# Patient Record
Sex: FEMALE | URBAN - METROPOLITAN AREA
[De-identification: names, ages, dates, MRNs, and addresses within clinical notes are randomized per-mention and may not be internally consistent; named-entity substitution may affect disease eponyms.]

---

## 2024-05-07 ENCOUNTER — APPOINTMENT (RX ONLY)
Dept: URBAN - METROPOLITAN AREA CLINIC 77 | Facility: CLINIC | Age: 34
Setting detail: DERMATOLOGY
End: 2024-05-07

## 2024-05-07 DIAGNOSIS — L82.1 OTHER SEBORRHEIC KERATOSIS: ICD-10-CM

## 2024-05-07 DIAGNOSIS — L85.3 XEROSIS CUTIS: ICD-10-CM

## 2024-05-07 PROBLEM — D23.72 OTHER BENIGN NEOPLASM OF SKIN OF LEFT LOWER LIMB, INCLUDING HIP: Status: ACTIVE | Noted: 2024-05-07

## 2024-05-07 PROCEDURE — 99203 OFFICE O/P NEW LOW 30 MIN: CPT

## 2024-05-07 PROCEDURE — ? COUNSELING

## 2024-05-07 PROCEDURE — ? LOCATION MARKER (SIMPLE)

## 2024-06-12 ENCOUNTER — APPOINTMENT (RX ONLY)
Dept: URBAN - METROPOLITAN AREA CLINIC 77 | Facility: CLINIC | Age: 34
Setting detail: DERMATOLOGY
End: 2024-06-12

## 2024-06-12 DIAGNOSIS — L60.3 NAIL DYSTROPHY: ICD-10-CM

## 2024-06-12 DIAGNOSIS — D18.0 HEMANGIOMA: ICD-10-CM

## 2024-06-12 DIAGNOSIS — D22 MELANOCYTIC NEVI: ICD-10-CM

## 2024-06-12 DIAGNOSIS — L82.0 INFLAMED SEBORRHEIC KERATOSIS: ICD-10-CM

## 2024-06-12 DIAGNOSIS — L81.4 OTHER MELANIN HYPERPIGMENTATION: ICD-10-CM

## 2024-06-12 PROBLEM — D18.01 HEMANGIOMA OF SKIN AND SUBCUTANEOUS TISSUE: Status: ACTIVE | Noted: 2024-06-12

## 2024-06-12 PROBLEM — D22.5 MELANOCYTIC NEVI OF TRUNK: Status: ACTIVE | Noted: 2024-06-12

## 2024-06-12 PROBLEM — D23.9 OTHER BENIGN NEOPLASM OF SKIN, UNSPECIFIED: Status: ACTIVE | Noted: 2024-06-12

## 2024-06-12 PROCEDURE — 17110 DESTRUCTION B9 LES UP TO 14: CPT

## 2024-06-12 PROCEDURE — ? ADDITIONAL NOTES

## 2024-06-12 PROCEDURE — ? LIQUID NITROGEN

## 2024-06-12 PROCEDURE — ? TREATMENT REGIMEN

## 2024-06-12 PROCEDURE — 99213 OFFICE O/P EST LOW 20 MIN: CPT | Mod: 25

## 2024-06-12 PROCEDURE — ? COUNSELING

## 2024-06-12 ASSESSMENT — LOCATION DETAILED DESCRIPTION DERM
LOCATION DETAILED: UPPER STERNUM
LOCATION DETAILED: RIGHT MEDIAL SUPERIOR CHEST
LOCATION DETAILED: RIGHT ANTERIOR SHOULDER
LOCATION DETAILED: LEFT ANTERIOR SHOULDER
LOCATION DETAILED: LEFT SUPERIOR MEDIAL MIDBACK
LOCATION DETAILED: LEFT INFERIOR MEDIAL FOREHEAD
LOCATION DETAILED: PERIUMBILICAL SKIN

## 2024-06-12 ASSESSMENT — LOCATION SIMPLE DESCRIPTION DERM
LOCATION SIMPLE: CHEST
LOCATION SIMPLE: LEFT FOREHEAD
LOCATION SIMPLE: LEFT LOWER BACK
LOCATION SIMPLE: RIGHT SHOULDER
LOCATION SIMPLE: LEFT SHOULDER
LOCATION SIMPLE: ABDOMEN

## 2024-06-12 ASSESSMENT — LOCATION ZONE DERM
LOCATION ZONE: FACE
LOCATION ZONE: ARM
LOCATION ZONE: TRUNK

## 2024-06-12 NOTE — PROCEDURE: LIQUID NITROGEN
Show Aperture Variable?: Yes
Render Note In Bullet Format When Appropriate: No
Medical Necessity Clause: This procedure was medically necessary because the lesions that were treated were: warts.
Duration Of Freeze Thaw-Cycle (Seconds): 2
Detail Level: Detailed
Consent: The patient's consent was obtained including but not limited to risks of crusting, scabbing, blistering, scarring, darker or lighter pigmentary change, recurrence, incomplete removal and infection.
Medical Necessity Information: It is in your best interest to select a reason for this procedure from the list below. All of these items fulfill various CMS LCD requirements except the new and changing color options.
Spray Paint Text: The liquid nitrogen was applied to the skin utilizing a spray paint frosting technique.
Post-Care Instructions: I reviewed with the patient in detail post-care instructions. Patient is to wear sunprotection, and avoid picking at any of the treated lesions. Pt may apply Vaseline to crusted or scabbing areas.
Number Of Freeze-Thaw Cycles: 3 freeze-thaw cycles

## 2024-06-12 NOTE — PROCEDURE: ADDITIONAL NOTES
Render Risk Assessment In Note?: no
Additional Notes: Possible Onychomycosis- will biopsy at a later time. Patient possibly pregnant.
Detail Level: Detailed

## 2025-01-15 ENCOUNTER — HOSPITAL ENCOUNTER (OUTPATIENT)
Dept: HOSPITAL 104 - SLDO | Age: 35
Discharge: HOME | End: 2025-01-15
Payer: COMMERCIAL

## 2025-01-15 DIAGNOSIS — R30.0: Primary | ICD-10-CM

## 2025-01-15 LAB
BLOOD,URINE: (no result)
PH,URINE: 6.5 (ref 5–7)
RBC,URINE: < 1 /HPF (ref 0–3)
SPECIFIC GRAVITY,URINE: 1.01 (ref 1–1.03)
SQUAMOUS EPITHELIAL CELL,URINE: 1 /HPF (ref 0–5)
WBC,URINE: 1 /HPF (ref 0–5)

## 2025-01-15 PROCEDURE — 87086 URINE CULTURE/COLONY COUNT: CPT

## 2025-01-15 PROCEDURE — 81001 URINALYSIS AUTO W/SCOPE: CPT

## 2025-03-13 ENCOUNTER — HOSPITAL ENCOUNTER (OUTPATIENT)
Dept: HOSPITAL 104 - COPL | Age: 35
Discharge: HOME | End: 2025-03-13
Payer: COMMERCIAL

## 2025-03-13 DIAGNOSIS — R79.89: ICD-10-CM

## 2025-03-13 DIAGNOSIS — N92.6: Primary | ICD-10-CM

## 2025-03-13 LAB
ALANINE AMINOTRANSFERASE: 89 U/L (ref 10–49)
ALBUMIN, SERUM: 4.7 GM/DL (ref 3.5–5)
ALBUMIN/GLOBULIN RATIO: 1.8 (ref 1.2–2.2)
ALKALINE PHOSPHATASE: 102 U/L (ref 46–116)
ANION GAP: 8 (ref 7–16)
ASPARTATE AMINO TRANSFERASE: 45 U/L (ref 0–34)
BASOPHILS # (AUTO): 0 THOU/MM3 (ref 0–0.2)
BASOPHILS % (AUTO): 0 % (ref 0–2.5)
BILIRUBIN,TOTAL: 0.2 MG/DL (ref 0.3–1.2)
BLOOD UREA NITROGEN: 17 MG/DL (ref 9–23)
BUN/CREATININE RATIO: 19 RATIO (ref 12–20)
CALCIUM (CORRECTED): 10.4 MG/DL (ref 8.5–10.1)
CALCIUM: 10.4 MG/DL (ref 8.3–10.6)
CARBON DIOXIDE: 25.9 MMOL/L (ref 20–31)
CHLORIDE: 110 MMOL/L (ref 98–107)
CREATININE (COMPONENT): 0.9 MG/DL (ref 0.6–1.3)
EGFR: > 60 SEE NOTE
EOSINOPHILS # (AUTO): 0.2 THOU/MM3 (ref 0–0.5)
EOSINOPHILS % (AUTO): 4 % (ref 0–10)
FREE T4 (FREE THYROXINE): 1.07 NG/DL (ref 0.89–1.76)
FSH SERPL-ACNC: 7.09 MIU/ML
GLOBULIN: 2.6 GM/DL (ref 2.3–3.5)
GLUCOSE: 105 MG/DL (ref 74–106)
HEMATOCRIT: 39.7 % (ref 36–46)
HEMOGLOBIN: 13.3 G/DL (ref 12–16)
IMM GRANULOCYTES # BLD AUTO: 0.01 THOU/MM3 (ref 0–0)
IMMATURE GRANULOCYTES % (AUTO): 0 % (ref 0–0)
LYMPHOCYTES # (AUTO): 1.7 THOU/MM3 (ref 1–4.8)
LYMPHOCYTES % (AUTO): 27 % (ref 10–50)
MEAN CORPUSCULAR HEMOGLOBIN: 27.5 PG (ref 25–35)
MEAN CORPUSCULAR HGB CONC: 33.5 G/DL (ref 31–37)
MEAN CORPUSCULAR VOLUME: 82 FL (ref 80–100)
MONOCYTES # (AUTO): 0.4 THOU/MM3 (ref 0–0.8)
MONOCYTES % (AUTO): 6 % (ref 0–12)
NEUTROPHILS # (AUTO): 3.9 THOU/MM3 (ref 1.8–7.7)
NEUTROPHILS % (AUTO): 63 % (ref 37–80)
NRBC BLD-RTO: 0 /100 WBC
NUCLEATED RED BLOOD CELL #: 0 THOU/MM3 (ref 0–0)
OSMOLALITY,CALCULATED: 288 (ref 275–295)
PLATELET COUNT: 251 THOU/MM3 (ref 140–440)
POTASSIUM: 4.2 MMOL/L (ref 3.4–5.1)
RDW STANDARD DEVIATION: 40 FL (ref 36.4–46.3)
RED BLOOD COUNT: 4.83 MILN/MM3 (ref 4–5.2)
SODIUM: 144 MMOL/L (ref 136–145)
THYROID STIMULATING HORMONE: 3.23 UIU/ML (ref 0.55–4.78)
TOTAL PROTEIN: 7.3 GM/DL (ref 5.7–8.2)
WHITE BLOOD COUNT: 6.2 THOU/MM3 (ref 3.6–11)

## 2025-03-13 PROCEDURE — 83002 ASSAY OF GONADOTROPIN (LH): CPT

## 2025-03-13 PROCEDURE — 84443 ASSAY THYROID STIM HORMONE: CPT

## 2025-03-13 PROCEDURE — 84439 ASSAY OF FREE THYROXINE: CPT

## 2025-03-13 PROCEDURE — 83001 ASSAY OF GONADOTROPIN (FSH): CPT

## 2025-03-13 PROCEDURE — 36415 COLL VENOUS BLD VENIPUNCTURE: CPT

## 2025-03-13 PROCEDURE — 80053 COMPREHEN METABOLIC PANEL: CPT

## 2025-03-13 PROCEDURE — 85025 COMPLETE CBC W/AUTO DIFF WBC: CPT

## 2025-03-17 LAB — LUTEINIZING HORMONE*: 5 MIU/ML
